# Patient Record
Sex: MALE | Race: WHITE
[De-identification: names, ages, dates, MRNs, and addresses within clinical notes are randomized per-mention and may not be internally consistent; named-entity substitution may affect disease eponyms.]

---

## 2017-03-21 NOTE — PCM.PREANE
Preanesthetic Assessment





- Anesthesia/Transfusion/Family Hx


Anesthesia History: Prior Anesthesia Without Reaction


Type of Anesthesia Reaction: Unknown


Family History of Anesthesia Reaction: No


Transfusion History: Unknown





- Review of Systems


General: No Symptoms


Pulmonary: No Symptoms


Cardiovascular: No Symptoms


Gastrointestinal: No symptoms


Neurological: No Symptoms


Other: Reports: None





- Physical Assessment


NPO Status Date: 03/20/17


NPO Status Time: 20:00


Pulse: 46


O2 Sat by Pulse Oximetry: 96


Respiratory Rate: 16


Blood Pressure: 146/63


Vital Signs: 





 Last Vital Signs











Temp  36.3 C   03/21/17 10:35


 


Pulse  45 L  03/21/17 10:35


 


Resp  16   03/21/17 10:35


 


BP  146/63 H  03/21/17 10:35


 


Pulse Ox  96   03/21/17 10:35











Height: 1.93 m


Weight: 106.594 kg


ASA Class: 1


Mental Status: Alert & Oriented x3


Dentition: Reports: Normal Dentition


Thyro-Mental Finger Breadths: 3


Mouth Opening Finger Breadths: 3


ROM/Head Extension: Full


Lungs: Clear to auscultation, Normal respiratory effort


Cardiovascular: Regular Rate, Regular Rhythm





- Allergies


Allergies/Adverse Reactions: 


 Allergies











Allergy/AdvReac Type Severity Reaction Status Date / Time


 


No Known Allergies Allergy   Verified 03/21/17 10:56














- Blood


Blood Available: No


Product(s) Available: None





- Anesthesia Plan


Pre-Op Medication Ordered: None





- Acknowledgements


Anesthesia Type Planned: MAC


Pt an Appropriate Candidate for the Planned Anesthesia: Yes


Alternatives and Risks of Anesthesia Discussed w Pt/Guardian: Yes


Pt/Guardian Understands and Agrees with Anesthesia Plan: Yes





PreAnesthesia Questionnaire





- HOME MEDS


Home Medications: 


 Home Meds





Multivitamin [Multivitamins] 1 tab PO DAILY 03/20/17 [History]











- CURRENT (IN HOUSE) MEDS


Current Meds: 





 Current Medications





Apraclonidine HCl (Iopidine 0.5% Ophth Soln)  0 ml EYELF ASDIRECTED KATHERINE


   Stop: 03/21/17 18:00


   Last Admin: 03/21/17 10:46 Dose:  1 drop


Cefuroxime Sodium (Zinacef)  0 mg EYELF ASDIRECTED KATHERINE


   Stop: 03/21/17 18:00


Lidocaine HCl (Xylocaine-Mpf 1%)  2 ml INJECT ASDIRECTED KATHERINE


   Stop: 03/21/17 18:00


Phenylephrine HCl (Haresh-Synephrine 2.5% Ophth Soln)  0 ml EYELF ASDIRECTED KATHERINE


   Stop: 03/21/17 18:00


   Last Admin: 03/21/17 11:11 Dose:  1 drop


Pilocarpine HCl (Pilocar 4% Ophth Soln)  0 ml EYELF ASDIRECTED KATHERINE


   Stop: 03/21/17 18:00


Polymyxin/Trimethoprim Sulfate (Polytrim Ophth Soln)  0 ml EYELF ASDIRECTED KATHERINE


   Stop: 03/21/17 18:00


   Last Admin: 03/21/17 10:41 Dose:  1 drop


Proparacaine HCl (Proparacaine 0.5% Ophth Soln)  0 ml EYEBOTH ASDIRECTED KATHERINE


   Stop: 03/21/17 18:00


Tetracaine (Pontocaine 0.5% Ophth Drops)  0 ml EYELF ASDIRECTED KATHERINE


   Stop: 03/21/17 18:00


Tropicamide (Mydriacyl 1% Ophth Soln)  0 ml EYELF ASDIRECTED KATHERINE


   Stop: 03/21/17 18:00


   Last Admin: 03/21/17 11:07 Dose:  1 drop











Preanesthetic Assessment





- PHYSICAL ASSESSMENT


O2 Sat by Pulse Oximetry: 96


RR: 16


Vital Signs: 





 Last Vital Signs











Temp  36.3 C   03/21/17 10:35


 


Pulse  45 L  03/21/17 10:35


 


Resp  16   03/21/17 10:35


 


BP  146/63 H  03/21/17 10:35


 


Pulse Ox  96   03/21/17 10:35











Height: 1.93 m


Weight: 106.594 kg


NPO Status Date: 03/20/17


NPO Status Time: 20:00





- ALLERGIES


Allergies/Adverse Reactions: 


 Allergies











Allergy/AdvReac Type Severity Reaction Status Date / Time


 


No Known Allergies Allergy   Verified 03/21/17 10:56

## 2017-05-16 NOTE — PCM.PREANE
Preanesthetic Assessment





- Anesthesia/Transfusion/Family Hx


Anesthesia History: Prior Anesthesia Without Reaction


Family History of Anesthesia Reaction: No


Transfusion History: No Prior Transfusion(s)





- Review of Systems


General: No Symptoms


Pulmonary: No Symptoms


Cardiovascular: No Symptoms


Gastrointestinal: No symptoms


Neurological: No Symptoms


Other: Reports: None





- Physical Assessment


NPO Status Date: 05/16/17


NPO Status Time: 05:00


Pulse: 48


O2 Sat by Pulse Oximetry: 95


Respiratory Rate: 16


Blood Pressure: 155/85


Temperature: 36.3 C


Height: 1.93 m


Weight: 106.594 kg


ASA Class: 2


Mental Status: Alert & Oriented x3


Dentition: Reports: Normal Dentition, Crown(s), Broken Tooth/Teeth


Thyro-Mental Finger Breadths: 3


Mouth Opening Finger Breadths: 2


ROM/Head Extension: Full


Lungs: Clear to auscultation, Normal respiratory effort


Cardiovascular: Regular Rate, Regular Rhythm, No Murmurs





- Allergies


Allergies/Adverse Reactions: 


 Allergies











Allergy/AdvReac Type Severity Reaction Status Date / Time


 


No Known Allergies Allergy   Verified 05/15/17 15:33














- Blood


Blood Available: No


Product(s) Available: None





- Anesthesia Plan


Pre-Op Medication Ordered: None





- Acknowledgements


Anesthesia Type Planned: MAC


Pt an Appropriate Candidate for the Planned Anesthesia: Yes


Alternatives and Risks of Anesthesia Discussed w Pt/Guardian: Yes


Pt/Guardian Understands and Agrees with Anesthesia Plan: Yes





PreAnesthesia Questionnaire





- SUBSTANCE USE


Smoking Status *Q: Never Smoker


Tobacco Use Within Last Twelve Months: No


Second Hand Smoke Exposure: No


Days Per Week of Alcohol Use: 2


Number of Drinks Per Day: 1


Total Drinks Per Week: 2


Recreational Drug Use History: No





- HOME MEDS


Home Medications: 


 Home Meds





Multivitamin [Multivitamins] 1 tab PO DAILY 03/20/17 [History]











- CURRENT (IN HOUSE) MEDS


Current Meds: 





 Current Medications





Brimonidine Tartrate (Alphagan 0.2% Ophth Soln)  0 ml EYERT ASDIRECTED KATHERINE


   Stop: 05/16/17 18:00


Cefuroxime Sodium (Zinacef)  0 mg EYERT ASDIRECTED KATHERINE


   Stop: 05/16/17 18:00


Lidocaine HCl (Xylocaine-Mpf 1%)  1 ml INJECT ASDIRECTED KATHERINE


   Stop: 05/16/17 18:00


Phenylephrine HCl (Haresh-Synephrine 2.5% Ophth Soln)  0 ml EYERT ASDIRECTED KATHERINE


   Stop: 05/16/17 18:00


Pilocarpine HCl (Pilocar 4% Ophth Soln)  0 ml EYERT ASDIRECTED KATHERINE


   Stop: 05/16/17 18:00


Polymyxin/Trimethoprim Sulfate (Polytrim Ophth Soln)  0 ml EYERT ASDIRECTED KATHERINE


   Stop: 05/16/17 18:00


Tetracaine (Pontocaine 0.5% Ophth Drops)  0 ml EYERT ASDIRECTED KATHERINE


   Stop: 05/16/17 18:00


Tropicamide (Mydriacyl 1% Ophth Soln)  0 ml EYERT ASDIRECTED KATHERINE


   Stop: 05/16/17 18:00

## 2021-05-14 NOTE — PCM.SN.2
- Free Text/Narrative


Note: 





Right selective femoral nerve block at the adductor canal for post-procedure 

pain control under US guidance requested by Dr. Alva.


Time Out: 0911


Start: 0915


End: 0915





Chart reviewed.  Consent signed.  Questions answered.





 Appropriate monitors applied.  Time out performed. 





Right mid-shaft femur identified with ultrasound, scanning medially of femur, 

the femoral artery in the adductor canal visualized, and the femoral nerve 

located laterally to the artery. 





The skin was prepped lateral to the ultrasound probe with chlorahexadine times 

two.  


The 21ga 4 insulated block needle was inserted under direct ultrasound guidance

into the adductor canal. 


 20mL of 0.5% ropivacaine with 1:200,000 epinephrine was injected 

circumferentially around the nerve with intermittent negative aspiration noted. 




Patient tolerated the procedure well.  Sterile technique noted along with 

sterile gloves, mask, and sterile probe cover.  


See picture on progress note and vital signs on nurses notes.  





Block completed in PACU.  


Thank you,


Cielo Owens CRNA

## 2021-05-17 ENCOUNTER — HOSPITAL ENCOUNTER (OUTPATIENT)
Dept: HOSPITAL 41 - JD.SDS | Age: 71
Discharge: HOME | End: 2021-05-17
Attending: ORTHOPAEDIC SURGERY
Payer: MEDICARE

## 2021-05-17 VITALS — SYSTOLIC BLOOD PRESSURE: 131 MMHG | HEART RATE: 61 BPM | DIASTOLIC BLOOD PRESSURE: 82 MMHG

## 2021-05-17 DIAGNOSIS — M25.561: ICD-10-CM

## 2021-05-17 DIAGNOSIS — Z79.84: ICD-10-CM

## 2021-05-17 DIAGNOSIS — Z79.899: ICD-10-CM

## 2021-05-17 DIAGNOSIS — T84.84XA: Primary | ICD-10-CM

## 2021-05-17 DIAGNOSIS — E11.9: ICD-10-CM

## 2021-05-17 DIAGNOSIS — Z96.651: ICD-10-CM

## 2021-05-17 DIAGNOSIS — R00.1: ICD-10-CM

## 2021-05-17 DIAGNOSIS — G89.18: ICD-10-CM

## 2021-05-17 DIAGNOSIS — Z87.891: ICD-10-CM

## 2021-05-17 DIAGNOSIS — E78.5: ICD-10-CM

## 2021-05-17 DIAGNOSIS — H93.13: ICD-10-CM

## 2021-05-17 DIAGNOSIS — Z79.82: ICD-10-CM

## 2021-05-17 DIAGNOSIS — E78.00: ICD-10-CM

## 2021-05-17 PROCEDURE — C1776 JOINT DEVICE (IMPLANTABLE): HCPCS

## 2021-05-17 PROCEDURE — 73560 X-RAY EXAM OF KNEE 1 OR 2: CPT

## 2021-05-17 PROCEDURE — 97535 SELF CARE MNGMENT TRAINING: CPT

## 2021-05-17 PROCEDURE — 97110 THERAPEUTIC EXERCISES: CPT

## 2021-05-17 PROCEDURE — 97165 OT EVAL LOW COMPLEX 30 MIN: CPT

## 2021-05-17 PROCEDURE — 27486 REVISE/REPLACE KNEE JOINT: CPT

## 2021-05-17 PROCEDURE — 82947 ASSAY GLUCOSE BLOOD QUANT: CPT

## 2021-05-17 PROCEDURE — 97161 PT EVAL LOW COMPLEX 20 MIN: CPT

## 2021-05-17 RX ADMIN — CEFUROXIME PRN MG: 750 INJECTION, POWDER, FOR SOLUTION INTRAMUSCULAR; INTRAVENOUS at 08:09

## 2021-05-17 RX ADMIN — DEXTROSE ONE GM: 5 SOLUTION INTRAVENOUS at 08:29

## 2021-05-17 RX ADMIN — CEFUROXIME PRN MG: 750 INJECTION, POWDER, FOR SOLUTION INTRAMUSCULAR; INTRAVENOUS at 08:19

## 2021-05-17 RX ADMIN — DEXTROSE ONE GM: 5 SOLUTION INTRAVENOUS at 08:10

## 2021-05-17 NOTE — CR
Right knee: AP and lateral views of the right knee were obtained 

utilizing portable technique.

 

Comparison: Prior knee study of 08/03/09.

 

Right knee prosthesis is seen.  Components are aligned.  Underlying 

bony structures are intact.  Patellar prosthesis is also seen.  Small 

joint effusion is seen.  Soft tissue air is noted.

 

Impression:

1.  Satisfactory appearance of recently placed right knee prosthesis.

 

Diagnostic code #2

## 2021-05-17 NOTE — [id number]
Post Anesthesia Note





- EVALUATION WITHIN 48HRS OF ANESTHETIC


Vital Signs in Normal Range: Yes


Patient Participated in Evaluation: Yes


Respiratory Function Stable: Yes


Airway Patent: Yes


Cardiovascular Function Stable: Yes


Hydration Status Stable: Yes


Pain Control Satisfactory: Yes


Nausea and Vomiting Control Satisfactory: Yes


Mental Status Recovered: Yes


Vital Signs: 


                                Last Vital Signs











Temp  36.3 C   05/17/21 10:10


 


Pulse  59 L  05/17/21 10:10


 


Resp  14   05/17/21 10:10


 


BP  115/62   05/17/21 10:10


 


Pulse Ox  97   05/17/21 10:10

## 2021-05-17 NOTE — PCM.PREANE
Preanesthetic Assessment





- Anesthesia/Transfusion/Family Hx


Anesthesia History: Prior Anesthesia Without Reaction


Family History of Anesthesia Reaction: No


Transfusion History: No Prior Transfusion(s)


Intubation History: History of Difficulty Intubation





- Review of Systems


Other: Reports: Diabetes ( @ 04798)





- Physical Assessment


NPO Status Date: 05/16/21


NPO Status Time: 20:00


Vital Signs: 





                                Last Vital Signs











Temp  36.4 C   05/17/21 06:20


 


Pulse  54 L  05/17/21 06:20


 


Resp  16   05/17/21 06:20


 


BP  134/74   05/17/21 06:20


 


Pulse Ox  97   05/17/21 06:20











Cardiovascular: Murmurs





- Allergies


Allergies/Adverse Reactions: 


                                    Allergies











Allergy/AdvReac Type Severity Reaction Status Date / Time


 


No Known Allergies Allergy   Verified 05/14/21 12:44














PreAnesthesia Questionnaire


HEENT History: Reports: Other (See Below)


Other HEENT History: tinnitis, decreased hearing, wears glasses


Other Cardiovascular History: mitral valve regurgitation, high cholesterol, 

bradycardia


Respiratory History: Reports: Other (See Below)


Other Respiratory History: history of difficult intubation


Gastrointestinal History: Reports: Diverticulosis, Hiatal Hernia, Other (See 

Below)


Other Gastrointestinal History: umbilical hernia


Genitourinary History: Reports: Other (See Below)


Other Genitourinary History: acute kidney injury, kidney biopsy


OB/GYN History: Reports: None


Musculoskeletal History: Reports: Arthritis, Other (See Below)


Other Musculoskeletal History: left shoulder pain


Neurological History: Reports: None


Psychiatric History: Reports: None


Endocrine/Metabolic History: Reports: Diabetes, Type II


Hematologic History: Reports: None


Immunologic History: Reports: None


Oncologic (Cancer) History: Reports: None


Dermatologic History: Reports: None





- Past Surgical History


Head Surgeries/Procedures: Reports: None


HEENT Surgical History: Reports: Eye Surgery, Tonsillectomy


Cardiovascular Surgical History: Reports: None


Respiratory Surgical History: Reports: None


GI Surgical History: Reports: Colonoscopy, Other (See Below)


Other GI Surgeries/Procedures: umbilical hernia, laparoscopy, ventral hernia 

with repair


Female  Surgical History: Reports: None


Male  Surgical History: Reports: None


Endocrine Surgical History: Reports: None


Neurological Surgical History: Reports: None


Musculoskeletal Surgical History: Reports: Knee Replacement


Dermatological Surgical History: Reports: None





- SUBSTANCE USE


Tobacco Use Status *Q: Former Tobacco User


Recreational Drug Use History: No





- HOME MEDS


Home Medications: 


                                    Home Meds





Multivitamin [Multivitamins] 1 tab PO DAILY 03/20/17 [History]


Acetaminophen [Tylenol] 650 mg PO DAILY 05/14/21 [History]


Beta-Carotene(A) w/C & E/Min [Prosight] 1 tab PO DAILY 05/14/21 [History]


Cholecalciferol (Vitamin D3) [Vitamin D3] 5,000 unit PO DAILY 05/14/21 [History]


Rosuvastatin Calcium 20 mg PO DAILY 05/14/21 [History]


Tamsulosin HCl [Flomax] 0.4 mg PO DAILY 05/14/21 [History]


Zinc 50 mg PO DAILY 05/14/21 [History]


metFORMIN [Glucophage XR] 500 mg PO BID 05/14/21 [History]


Aspirin [Aspirin EC] 325 mg PO BID #84 tab 05/17/21 [Rx]


Cyclobenzaprine [Flexeril] 10 mg PO BID PRN #20 tab 05/17/21 [Rx]


oxyCODONE 5 - 10 mg PO Q4H PRN #40 tab 05/17/21 [Rx]











- CURRENT (IN HOUSE) MEDS


Current Meds: 





                               Current Medications





Acetaminophen (Acetaminophen 325 Mg Tab)  975 mg PO ONETIME KATHERINE


Morphine Sulfate 8 mg/Epinephrine HCl 0.3 mg/Cefuroxime Sodium 750 mg/Ketorolac 

Tromethamine 30 mg/Sodium Chloride 7.9 ml  0 mg .XX ASDIRECTED PRN


   PRN Reason: Pain


   Stop: 05/17/21 13:00


Lactated Ringer's (Ringers, Lactated)  1,000 mls @ 125 mls/hr IV ASDIRECTED KAHTERINE


   Stop: 05/17/21 23:00


Lidocaine/Sodium Bicarbonate (Lidocaine 1%/Sod Bicarbonate In Ns 8.4% 1 Ml 

Syringe)  0.25 ml IDERM ONETIME PRN


   PRN Reason: Prior to IV Start


   Stop: 05/17/21 18:00


Oxycodone HCl (Oxycodone Er 10 Mg Tab.Er)  10 mg PO ONETIME KATHERINE


Pregabalin (Pregabalin 25 Mg Cap)  50 mg PO ONETIME KATHERINE


Sodium Chloride (Sodium Chloride 0.9% 10 Ml Syringe)  10 ml FLUSH ASDIRECTED PRN


   PRN Reason: Keep Vein Open


   Stop: 05/17/21 18:00





Discontinued Medications





Cefazolin Sodium (Cefazolin 1 Gm Vial) Confirm Administered Dose 2 gm .ROUTE 

.STK-MED ONE


   Stop: 05/17/21 06:21


Epinephrine HCl (Epinephrine 1 Mg/Ml Sdv) Confirm Administered Dose 1 mg .ROUTE 

.STK-MED ONE


   Stop: 05/17/21 06:15


Fentanyl (Fentanyl 100 Mcg/2 Ml Sdv) Confirm Administered Dose 100 mcg .ROUTE 

.STK-MED ONE


   Stop: 05/17/21 06:21


Lidocaine HCl (Xylocaine-Mpf 1%) Confirm Administered Dose 4 mls @ as directed 

.ROUTE .STK-MED ONE


   Stop: 05/17/21 06:21


Midazolam HCl (Midazolam 1 Mg/Ml 2 Ml Sdv) Confirm Administered Dose 2 mg .ROUTE

 .STK-MED ONE


   Stop: 05/17/21 06:21


Propofol (Propofol 200 Mg/20 Ml Sdv) Confirm Administered Dose 200 mg .ROUTE 

.STK-MED ONE


   Stop: 05/17/21 06:21


Ropivacaine (Ropivacaine 0.5% 5 Mg/Ml 30 Ml Sdv) Confirm Administered Dose 30 ml

 .ROUTE .STK-MED ONE


   Stop: 05/17/21 06:15


Tranexamic Acid (Tranexamic Acid 1,000 Mg/10 Ml Amp) Confirm Administered Dose 

1,000 mg .ROUTE .STK-MED ONE


   Stop: 05/17/21 06:27


Vancomycin HCl (Vancomycin 1 Gm Sdv) Confirm Administered Dose 1 gm .ROUTE .STK-

MED ONE


   Stop: 05/17/21 06:27

## 2021-05-17 NOTE — PCM.POSTAN
POST ANESTHESIA ASSESSMENT





- MENTAL STATUS


Mental Status: Alert, Oriented





- VITAL SIGNS


Vital Signs: 


                                Last Vital Signs











Temp  36.4 C   05/17/21 06:20


 


Pulse  54 L  05/17/21 06:20


 


Resp  16   05/17/21 06:20


 


BP  134/74   05/17/21 06:20


 


Pulse Ox  97   05/17/21 06:20














- RESPIRATORY


Respiratory Status: Respiratory Rate WNL, Airway Patent, O2 Saturation Stable





- CARDIOVASCULAR


CV Status: Pulse Rate WNL, Blood Pressure Stable





- GASTROINTESTINAL


GI Status: No Symptoms





- PAIN


Pain Score: 0





- POST OP HYDRATION


Hydration Status: Adequate & Stable

## 2021-05-17 NOTE — PCM.PREANE
Preanesthetic Assessment





- Anesthesia/Transfusion/Family Hx


Anesthesia History: Prior Anesthesia Without Reaction


Family History of Anesthesia Reaction: No


Transfusion History: No Prior Transfusion(s)


Intubation History: History of Difficulty Intubation





- Review of Systems


General: No Symptoms


Pulmonary: No Symptoms


Cardiovascular: No Symptoms


Gastrointestinal: No Symptoms


Neurological: No Symptoms


Other: Reports: Diabetes ( @ 04511)





- Physical Assessment


NPO Status Date: 05/16/21


NPO Status Time: 20:00


ASA Class: 2


Mental Status: Alert & Oriented x3


Airway Class: Mallampati = 3


Dentition: Reports: Normal Dentition


ROM/Head Extension: Full


Lungs: Clear to Auscultation, Normal Respiratory Effort


Cardiovascular: Regular Rate, Regular Rhythm





- Allergies


Allergies/Adverse Reactions: 


                                    Allergies











Allergy/AdvReac Type Severity Reaction Status Date / Time


 


No Known Allergies Allergy   Verified 05/14/21 12:44














- Acknowledgements


Anesthesia Type Planned: Spinal, Regional Block


Pt an Appropriate Candidate for the Planned Anesthesia: Yes


Alternatives and Risks of Anesthesia Discussed w Pt/Guardian: Yes


Pt/Guardian Understands and Agrees with Anesthesia Plan: Yes





PreAnesthesia Questionnaire


HEENT History: Reports: Other (See Below)


Other HEENT History: tinnitis, decreased hearing, wears glasses


Other Cardiovascular History: mitral valve regurgitation, high cholesterol, 

bradycardia


Respiratory History: Reports: Other (See Below)


Other Respiratory History: history of difficult intubation


Gastrointestinal History: Reports: Diverticulosis, Hiatal Hernia, Other (See 

Below)


Other Gastrointestinal History: umbilical hernia


Genitourinary History: Reports: Other (See Below)


Other Genitourinary History: acute kidney injury, kidney biopsy


OB/GYN History: Reports: None


Musculoskeletal History: Reports: Arthritis, Other (See Below)


Other Musculoskeletal History: left shoulder pain


Neurological History: Reports: None


Psychiatric History: Reports: None


Endocrine/Metabolic History: Reports: Diabetes, Type II


Hematologic History: Reports: None


Immunologic History: Reports: None


Oncologic (Cancer) History: Reports: None


Dermatologic History: Reports: None





- Past Surgical History


Head Surgeries/Procedures: Reports: None


HEENT Surgical History: Reports: Eye Surgery, Tonsillectomy


Cardiovascular Surgical History: Reports: None


Respiratory Surgical History: Reports: None


GI Surgical History: Reports: Colonoscopy, Other (See Below)


Other GI Surgeries/Procedures: umbilical hernia, laparoscopy, ventral hernia 

with repair


Female  Surgical History: Reports: None


Male  Surgical History: Reports: None


Endocrine Surgical History: Reports: None


Neurological Surgical History: Reports: None


Musculoskeletal Surgical History: Reports: Knee Replacement


Dermatological Surgical History: Reports: None





- SUBSTANCE USE


Tobacco Use Status *Q: Former Tobacco User


Recreational Drug Use History: No





- HOME MEDS


Home Medications: 


                                    Home Meds





Multivitamin [Multivitamins] 1 tab PO DAILY 03/20/17 [History]


Acetaminophen [Tylenol] 650 mg PO DAILY 05/14/21 [History]


Beta-Carotene(A) w/C & E/Min [Prosight] 1 tab PO DAILY 05/14/21 [History]


Cholecalciferol (Vitamin D3) [Vitamin D3] 5,000 unit PO DAILY 05/14/21 [History]


Rosuvastatin Calcium 20 mg PO DAILY 05/14/21 [History]


Tamsulosin HCl [Flomax] 0.4 mg PO DAILY 05/14/21 [History]


Zinc 50 mg PO DAILY 05/14/21 [History]


metFORMIN [Glucophage XR] 500 mg PO BID 05/14/21 [History]


Aspirin [Aspirin EC] 325 mg PO BID #84 tab 05/17/21 [Rx]


Cyclobenzaprine [Flexeril] 10 mg PO BID PRN #20 tab 05/17/21 [Rx]


oxyCODONE 5 - 10 mg PO Q4H PRN #40 tab 05/17/21 [Rx]











- CURRENT (IN HOUSE) MEDS


Current Meds: 





                               Current Medications





Acetaminophen (Acetaminophen 325 Mg Tab)  975 mg PO ONETIME KATHERINE


Morphine Sulfate 8 mg/Epinephrine HCl 0.3 mg/Cefuroxime Sodium 750 mg/Ketorolac 

Tromethamine 30 mg/Sodium Chloride 7.9 ml  0 mg .XX ASDIRECTED PRN


   PRN Reason: Pain


   Stop: 05/17/21 13:00


Lactated Ringer's (Ringers, Lactated)  1,000 mls @ 125 mls/hr IV ASDIRECTED KATHERINE


   Stop: 05/17/21 23:00


Lidocaine/Sodium Bicarbonate (Lidocaine 1%/Sod Bicarbonate In Ns 8.4% 1 Ml 

Syringe)  0.25 ml IDERM ONETIME PRN


   PRN Reason: Prior to IV Start


   Stop: 05/17/21 18:00


Oxycodone HCl (Oxycodone Er 10 Mg Tab.Er)  10 mg PO ONETIME KATHERINE


Pregabalin (Pregabalin 25 Mg Cap)  50 mg PO ONETIME KATHERINE


Sodium Chloride (Sodium Chloride 0.9% 10 Ml Syringe)  10 ml FLUSH ASDIRECTED PRN


   PRN Reason: Keep Vein Open


   Stop: 05/17/21 18:00





Discontinued Medications





Cefazolin Sodium (Cefazolin 1 Gm Vial) Confirm Administered Dose 2 gm .ROUTE 

.STK-MED ONE


   Stop: 05/17/21 06:21


Epinephrine HCl (Epinephrine 1 Mg/Ml Sdv) Confirm Administered Dose 1 mg .ROUTE 

.STK-MED ONE


   Stop: 05/17/21 06:15


Fentanyl (Fentanyl 100 Mcg/2 Ml Sdv) Confirm Administered Dose 100 mcg .ROUTE 

.STK-MED ONE


   Stop: 05/17/21 06:21


Lidocaine HCl (Xylocaine-Mpf 1%) Confirm Administered Dose 4 mls @ as directed 

.ROUTE .STK-MED ONE


   Stop: 05/17/21 06:21


Midazolam HCl (Midazolam 1 Mg/Ml 2 Ml Sdv) Confirm Administered Dose 2 mg .ROUTE

 .STK-MED ONE


   Stop: 05/17/21 06:21


Propofol (Propofol 200 Mg/20 Ml Sdv) Confirm Administered Dose 200 mg .ROUTE 

.STK-MED ONE


   Stop: 05/17/21 06:21


Ropivacaine (Ropivacaine 0.5% 5 Mg/Ml 30 Ml Sdv) Confirm Administered Dose 30 ml

 .ROUTE .STK-MED ONE


   Stop: 05/17/21 06:15


Tranexamic Acid (Tranexamic Acid 1,000 Mg/10 Ml Amp) Confirm Administered Dose 

1,000 mg .ROUTE .STK-MED ONE


   Stop: 05/17/21 06:27


Vancomycin HCl (Vancomycin 1 Gm Sdv) Confirm Administered Dose 1 gm .ROUTE .STK-

MED ONE


   Stop: 05/17/21 06:27

## 2021-05-24 NOTE — PCM.OPNOTE
- General Post-Op/Procedure Note


Date of Surgery/Procedure: 05/17/21


Operative Procedure(s): revision right total knee arthroplasty patellar 

component


Pre Op Diagnosis: painful right total knee arthroplasty


Post-Op Diagnosis: Same


Anesthesia Technique: Local, MAC, Spinal


Primary Surgeon: Chris Alva


Anesthesia Provider: Ina Grande


Assistant: Trena Og


Assistant: María Elena Bonilla in mLs: 5


Complications: None


Condition: Good


Free Text/Narrative:: 


35x10

## 2021-05-27 NOTE — OR
DATE OF OPERATION:  05/17/2021

 

SURGEON:  Chris Alva MD

 

OPERATION PERFORMED:  Revision right total knee arthroplasty, patellar

component.

 

PREOPERATIVE DIAGNOSIS:

Painful right total knee arthroplasty.

 

POSTOPERATIVE DIAGNOSIS:

Painful right total knee arthroplasty.

 

ANESTHESIA:

Local MAC with spinal.

 

ANESTHESIA PROVIDER:

Ina Grande CRNA

 

ASSISTANT:

Trena Og PA-C; and María Elena Bonilla LPN.

 

ESTIMATED BLOOD LOSS:

5 mL.

 

COMPLICATIONS:

None.

 

CONDITION:

Stable.

 

IMPLANTS:

Cemented Conetoe 35 x 10 mm asymmetric patella.

 

DESCRIPTION OF PROCEDURE:

The patient was identified in the preoperative holding area.  Proper site was

marked and identified by the surgeon.  The patient was taken back to the

operative theater, where after adequate anesthesia, the patient's right lower

extremity was sterilely prepped and draped in the usual sterile fashion.  OR

time-out was performed.  The patient received 2 g IV Ancef.  Right lower

extremity was then exsanguinated.  Tourniquet was insufflated to 250 mmHg.  The

previous anterior incision was utilized.  This was taken down and a medial

parapatellar arthrotomy was created.  The patient was noted to have a large

amount of scar tissue around the patellar component as well as calcified tissue

on the anterior portion of the tibia and around the entire patellar component

and into his quadriceps tendon.  At this time, I did an extensive debridement of

the synovium as well as all calcified tissue around his patellar component,

removing excess bone as well that had formed all around the patellar component.

At this time, a reciprocating saw was then used to remove the patellar

component.  The patellar component that was there measured a 30 mm.  At this

time, we resected it down to a 14 for a 35 x 10 mm patella.  Drill holes were

then drilled.  The patient was noted to have a small cyst, so we eccentrically

located the drill holes to miss the cyst.  At this time, cement was mixed on the

back table.  The patellar component was irrigated and the patellar component was

then cemented into place with a 35 x 10 mm asymmetric Tima patella.  The rest

of the components appeared intact.  There was no signs of infection otherwise.

The patient after we did the debridement and the new patellar component gained

10 degrees of flexion compared to preoperative motion.  1 L of pulse lavage

irrigation with Ancef was irrigated through the needle as well as IrriSept

irrigation.  Periarticular injection was completed.  #2 barbed suture was used

for closure of the medial parapatellar arthrotomy, 2-0 Vicryl was used

subcutaneously, as well as Stratafix and Prineo was used for closure of the

skin.  The patient had a sterile soft dressing applied and sent to the PACU in

stable condition.

 

DD:  05/27/2021 08:39:44

DT:  05/27/2021 09:10:55  MMODAL

Job #:  [id number]

## 2022-08-10 ENCOUNTER — HOSPITAL ENCOUNTER (EMERGENCY)
Dept: HOSPITAL 41 - JD.ED | Age: 72
Discharge: HOME | End: 2022-08-10
Payer: MEDICARE

## 2022-08-10 VITALS — HEART RATE: 55 BPM | DIASTOLIC BLOOD PRESSURE: 74 MMHG | SYSTOLIC BLOOD PRESSURE: 155 MMHG

## 2022-08-10 DIAGNOSIS — E78.00: ICD-10-CM

## 2022-08-10 DIAGNOSIS — Z96.651: ICD-10-CM

## 2022-08-10 DIAGNOSIS — Z79.82: ICD-10-CM

## 2022-08-10 DIAGNOSIS — Z79.84: ICD-10-CM

## 2022-08-10 DIAGNOSIS — E11.9: ICD-10-CM

## 2022-08-10 DIAGNOSIS — M16.11: Primary | ICD-10-CM

## 2022-09-19 ENCOUNTER — HOSPITAL ENCOUNTER (OUTPATIENT)
Dept: HOSPITAL 41 - JD.SDS | Age: 72
Discharge: HOME | End: 2022-09-19
Attending: ORTHOPAEDIC SURGERY
Payer: MEDICARE

## 2022-09-19 VITALS — SYSTOLIC BLOOD PRESSURE: 122 MMHG | DIASTOLIC BLOOD PRESSURE: 64 MMHG

## 2022-09-19 VITALS — HEART RATE: 58 BPM

## 2022-09-19 DIAGNOSIS — Z90.49: ICD-10-CM

## 2022-09-19 DIAGNOSIS — Z79.899: ICD-10-CM

## 2022-09-19 DIAGNOSIS — M16.11: Primary | ICD-10-CM

## 2022-09-19 DIAGNOSIS — E78.00: ICD-10-CM

## 2022-09-19 DIAGNOSIS — I34.0: ICD-10-CM

## 2022-09-19 DIAGNOSIS — Z79.84: ICD-10-CM

## 2022-09-19 DIAGNOSIS — E11.9: ICD-10-CM

## 2022-09-19 DIAGNOSIS — Z87.891: ICD-10-CM

## 2022-09-19 DIAGNOSIS — Z98.890: ICD-10-CM

## 2022-09-19 PROCEDURE — 86901 BLOOD TYPING SEROLOGIC RH(D): CPT

## 2022-09-19 PROCEDURE — 73501 X-RAY EXAM HIP UNI 1 VIEW: CPT

## 2022-09-19 PROCEDURE — C1776 JOINT DEVICE (IMPLANTABLE): HCPCS

## 2022-09-19 PROCEDURE — 86850 RBC ANTIBODY SCREEN: CPT

## 2022-09-19 PROCEDURE — 97161 PT EVAL LOW COMPLEX 20 MIN: CPT

## 2022-09-19 PROCEDURE — 27130 TOTAL HIP ARTHROPLASTY: CPT

## 2022-09-19 PROCEDURE — 86900 BLOOD TYPING SEROLOGIC ABO: CPT

## 2022-09-19 PROCEDURE — 36415 COLL VENOUS BLD VENIPUNCTURE: CPT

## 2022-09-19 PROCEDURE — 0055T BONE SRGRY CMPTR CT/MRI IMAG: CPT

## 2022-09-19 PROCEDURE — C1713 ANCHOR/SCREW BN/BN,TIS/BN: HCPCS

## 2022-09-19 PROCEDURE — 97116 GAIT TRAINING THERAPY: CPT

## 2022-11-10 ENCOUNTER — HOSPITAL ENCOUNTER (OUTPATIENT)
Dept: HOSPITAL 41 - JD.SDS | Age: 72
Discharge: HOME | End: 2022-11-10
Attending: ORTHOPAEDIC SURGERY
Payer: MEDICARE

## 2022-11-10 VITALS — HEART RATE: 54 BPM | SYSTOLIC BLOOD PRESSURE: 115 MMHG | DIASTOLIC BLOOD PRESSURE: 70 MMHG

## 2022-11-10 DIAGNOSIS — Z98.890: ICD-10-CM

## 2022-11-10 DIAGNOSIS — G56.11: ICD-10-CM

## 2022-11-10 DIAGNOSIS — Z79.84: ICD-10-CM

## 2022-11-10 DIAGNOSIS — Z90.49: ICD-10-CM

## 2022-11-10 DIAGNOSIS — Z96.659: ICD-10-CM

## 2022-11-10 DIAGNOSIS — G56.22: ICD-10-CM

## 2022-11-10 DIAGNOSIS — G56.03: Primary | ICD-10-CM

## 2022-11-10 DIAGNOSIS — I34.0: ICD-10-CM

## 2022-11-10 DIAGNOSIS — E78.00: ICD-10-CM

## 2022-11-10 DIAGNOSIS — E11.9: ICD-10-CM

## 2022-11-10 DIAGNOSIS — G56.02: ICD-10-CM

## 2022-11-10 DIAGNOSIS — Z87.891: ICD-10-CM

## 2022-12-01 ENCOUNTER — HOSPITAL ENCOUNTER (OUTPATIENT)
Dept: HOSPITAL 41 - JD.SDS | Age: 72
Discharge: HOME | End: 2022-12-01
Attending: ORTHOPAEDIC SURGERY
Payer: MEDICARE

## 2022-12-01 VITALS — SYSTOLIC BLOOD PRESSURE: 120 MMHG | HEART RATE: 61 BPM | DIASTOLIC BLOOD PRESSURE: 72 MMHG

## 2022-12-01 DIAGNOSIS — G56.22: ICD-10-CM

## 2022-12-01 DIAGNOSIS — Z79.899: ICD-10-CM

## 2022-12-01 DIAGNOSIS — Z98.890: ICD-10-CM

## 2022-12-01 DIAGNOSIS — E11.9: ICD-10-CM

## 2022-12-01 DIAGNOSIS — G56.02: Primary | ICD-10-CM

## 2022-12-01 DIAGNOSIS — Z87.891: ICD-10-CM

## 2022-12-01 DIAGNOSIS — Z90.49: ICD-10-CM

## 2022-12-01 DIAGNOSIS — Z79.84: ICD-10-CM

## 2022-12-01 DIAGNOSIS — G56.12: ICD-10-CM

## 2022-12-01 DIAGNOSIS — E78.00: ICD-10-CM

## 2022-12-01 PROCEDURE — 64721 CARPAL TUNNEL SURGERY: CPT

## 2022-12-01 PROCEDURE — 82947 ASSAY GLUCOSE BLOOD QUANT: CPT

## 2022-12-01 PROCEDURE — 64718 REVISE ULNAR NERVE AT ELBOW: CPT
